# Patient Record
Sex: FEMALE | Race: WHITE | NOT HISPANIC OR LATINO | Employment: FULL TIME | URBAN - METROPOLITAN AREA
[De-identification: names, ages, dates, MRNs, and addresses within clinical notes are randomized per-mention and may not be internally consistent; named-entity substitution may affect disease eponyms.]

---

## 2017-12-17 ENCOUNTER — HOSPITAL ENCOUNTER (EMERGENCY)
Facility: MEDICAL CENTER | Age: 42
End: 2017-12-17
Attending: EMERGENCY MEDICINE
Payer: MEDICAID

## 2017-12-17 VITALS
BODY MASS INDEX: 22.82 KG/M2 | HEIGHT: 69 IN | WEIGHT: 154.1 LBS | DIASTOLIC BLOOD PRESSURE: 57 MMHG | OXYGEN SATURATION: 95 % | HEART RATE: 71 BPM | RESPIRATION RATE: 16 BRPM | SYSTOLIC BLOOD PRESSURE: 112 MMHG | TEMPERATURE: 98.6 F

## 2017-12-17 DIAGNOSIS — N12 PYELONEPHRITIS: ICD-10-CM

## 2017-12-17 DIAGNOSIS — M54.41 ACUTE BILATERAL LOW BACK PAIN WITH RIGHT-SIDED SCIATICA: ICD-10-CM

## 2017-12-17 LAB
APPEARANCE UR: ABNORMAL
BACTERIA #/AREA URNS HPF: ABNORMAL /HPF
BILIRUB UR QL STRIP.AUTO: NEGATIVE
COLOR UR: YELLOW
CULTURE IF INDICATED INDCX: YES UA CULTURE
EPI CELLS #/AREA URNS HPF: ABNORMAL /HPF
GLUCOSE UR STRIP.AUTO-MCNC: NEGATIVE MG/DL
KETONES UR STRIP.AUTO-MCNC: ABNORMAL MG/DL
LEUKOCYTE ESTERASE UR QL STRIP.AUTO: NEGATIVE
MICRO URNS: ABNORMAL
MUCOUS THREADS #/AREA URNS HPF: ABNORMAL /HPF
NITRITE UR QL STRIP.AUTO: POSITIVE
PH UR STRIP.AUTO: 7.5 [PH]
PROT UR QL STRIP: NEGATIVE MG/DL
RBC # URNS HPF: ABNORMAL /HPF
RBC UR QL AUTO: ABNORMAL
SP GR UR STRIP.AUTO: 1.02
WBC #/AREA URNS HPF: ABNORMAL /HPF

## 2017-12-17 PROCEDURE — 81001 URINALYSIS AUTO W/SCOPE: CPT

## 2017-12-17 PROCEDURE — 99284 EMERGENCY DEPT VISIT MOD MDM: CPT

## 2017-12-17 PROCEDURE — 87086 URINE CULTURE/COLONY COUNT: CPT

## 2017-12-17 PROCEDURE — A9270 NON-COVERED ITEM OR SERVICE: HCPCS | Performed by: EMERGENCY MEDICINE

## 2017-12-17 PROCEDURE — 87077 CULTURE AEROBIC IDENTIFY: CPT

## 2017-12-17 PROCEDURE — 96372 THER/PROPH/DIAG INJ SC/IM: CPT

## 2017-12-17 PROCEDURE — 87186 SC STD MICRODIL/AGAR DIL: CPT

## 2017-12-17 PROCEDURE — 700111 HCHG RX REV CODE 636 W/ 250 OVERRIDE (IP): Performed by: EMERGENCY MEDICINE

## 2017-12-17 PROCEDURE — 700102 HCHG RX REV CODE 250 W/ 637 OVERRIDE(OP): Performed by: EMERGENCY MEDICINE

## 2017-12-17 RX ORDER — KETOROLAC TROMETHAMINE 30 MG/ML
30 INJECTION, SOLUTION INTRAMUSCULAR; INTRAVENOUS ONCE
Status: COMPLETED | OUTPATIENT
Start: 2017-12-17 | End: 2017-12-17

## 2017-12-17 RX ORDER — CEPHALEXIN 250 MG/1
500 CAPSULE ORAL ONCE
Status: COMPLETED | OUTPATIENT
Start: 2017-12-17 | End: 2017-12-17

## 2017-12-17 RX ORDER — NAPROXEN 500 MG/1
500 TABLET ORAL 2 TIMES DAILY WITH MEALS
Qty: 20 TAB | Refills: 0 | Status: SHIPPED | OUTPATIENT
Start: 2017-12-17 | End: 2021-08-19

## 2017-12-17 RX ORDER — IBUPROFEN 200 MG
400 TABLET ORAL EVERY 6 HOURS PRN
COMMUNITY

## 2017-12-17 RX ORDER — CEPHALEXIN 500 MG/1
500 CAPSULE ORAL 4 TIMES DAILY
Qty: 40 CAP | Refills: 0 | Status: SHIPPED | OUTPATIENT
Start: 2017-12-17 | End: 2017-12-27

## 2017-12-17 RX ADMIN — CEPHALEXIN 500 MG: 250 CAPSULE ORAL at 17:33

## 2017-12-17 RX ADMIN — KETOROLAC TROMETHAMINE 30 MG: 30 INJECTION, SOLUTION INTRAMUSCULAR at 14:40

## 2017-12-17 ASSESSMENT — PAIN SCALES - GENERAL: PAINLEVEL_OUTOF10: 6

## 2017-12-17 NOTE — ED NOTES
"Chief Complaint   Patient presents with   • Back Pain     Pt c/o R sided back pain and pain into RLE.  Denies urinary symptoms.     /72   Pulse 73   Temp 37 °C (98.6 °F)   Resp 18   Ht 1.753 m (5' 9\")   Wt 69.9 kg (154 lb 1.6 oz)   SpO2 96%   BMI 22.76 kg/m²     "

## 2017-12-17 NOTE — ED PROVIDER NOTES
"ED Provider Note    CHIEF COMPLAINT  Chief Complaint   Patient presents with   • Back Pain     Pt c/o R sided back pain and pain into RLE.  Denies urinary symptoms.       HPI  Harmony Villatoro is a 42 y.o. female who presents For evaluation of right-sided low back pain that radiates some posterior aspect of the right leg without weakness numbness or tingling. No fever. No acute injuries. She's had this on and off for the past 2 months, no known injury to precipitate it. She has never had associated weakness or numbness. She denies any dysuria or hematuria but states that when she has had urine tract infections in past she usually has no symptoms. She otherwise is healthy with no medical problems. She offers no other complaints    REVIEW OF SYSTEMS  Negative for fever, rash, chest pain, dyspnea, abdominal pain.     PAST MEDICAL HISTORY   has a past medical history of Hypohidrotic X-linked ectodermal dysplasia.    SOCIAL HISTORY  Social History     Social History Main Topics   • Smoking status: Current Some Day Smoker   • Smokeless tobacco: Never Used   • Alcohol use Yes   • Drug use: No   • Sexual activity: Not on file       SURGICAL HISTORY   has a past surgical history that includes gyn surgery.    CURRENT MEDICATIONS  I personally reviewed the medication list in the charting documentation.     ALLERGIES  Allergies   Allergen Reactions   • Sulfa Drugs Unspecified     Pt states \"I get bad abdominal pain\".         PHYSICAL EXAM  VITAL SIGNS: /72   Pulse 73   Temp 37 °C (98.6 °F)   Resp 18   Ht 1.753 m (5' 9\")   Wt 69.9 kg (154 lb 1.6 oz)   SpO2 96%   BMI 22.76 kg/m²   Constitutional: Alert in no apparent distress.  HENT: No signs of trauma.   Eyes: Conjunctiva normal, Non-icteric.   Chest: Normal nonlabored respirations  Skin: No erythema, No rash.   Musculoskeletal: Good range of motion in all major joints. Tenderness involving the right low back without midline tenderness or step-offs.  Neurologic: " Alert, No focal deficits noted. Normal symmetric upper and lower show any motor and sensory function bilaterally.  Psychiatric: Affect normal, Judgment normal.    DIAGNOSTIC STUDIES / PROCEDURES    LABS  Results for orders placed or performed during the hospital encounter of 12/17/17   URINALYSIS,CULTURE IF INDICATED   Result Value Ref Range    Color Yellow     Character Cloudy (A)     Specific Gravity 1.020 <1.035    Ph 7.5 5.0 - 8.0    Glucose Negative Negative mg/dL    Ketones Trace (A) Negative mg/dL    Protein Negative Negative mg/dL    Bilirubin Negative Negative    Nitrite Positive (A) Negative    Leukocyte Esterase Negative Negative    Occult Blood Small (A) Negative    Micro Urine Req Microscopic     Culture Indicated Yes UA Culture   URINE MICROSCOPIC (W/UA)   Result Value Ref Range    WBC Rare /hpf    RBC Rare /hpf    Bacteria Many (A) None /hpf    Epithelial Cells Few Few /hpf    Mucous Threads Few /hpf         COURSE & MEDICAL DECISION MAKING  Pertinent Labs & Imaging studies reviewed. (See chart for details)    Encounter Summary: This is a 42 y.o. female with right-sided sciatica that began today, history of the same in the past, no focal neurologic complaints or findings on exam. Will check urinalysis to rule out infection, will administer an IM injection of Toradol. If there is no evidence of infection, she'll be prescribed naproxen and Flexeril and instructed to follow-up with a primary care physician for further evaluation of her sciatica. Will go over strict return instructions. ------ urinalysis is concerning for infection, nitrate positive with many bacteria on the microscopy despite the lack of significant elevation in WBC. Given her back pain I'll treat her with a 10 day course of Keflex although I suspect this urinary finding could be simply incidental and the patient experiencing a musculoskeletal source of pain, she will however follow-up with a primary care physician for further evaluation  and strict return instructions have been discussed.      DISPOSITION: Discharge Home      FINAL IMPRESSION  1. Acute bilateral low back pain with right-sided sciatica    2. Pyelonephritis        This dictation was created using voice recognition software. The accuracy of the dictation is limited to the abilities of the software. I expect there may be some errors of grammar and possibly content. The nursing notes were reviewed and certain aspects of this information were incorporated into this note.    Electronically signed by: Tiago Christie, 12/17/2017 2:33 PM

## 2017-12-18 NOTE — DISCHARGE INSTRUCTIONS
Return immediately to the Emergency Department if you experience continuing or worsening discomfort in your back, any numbness/weakness/tingling, abdominal pain, fever, chest pain, difficulty breathing or any other new or worsening symptoms.        Sciatica  Sciatica is pain, weakness, numbness, or tingling along the path of the sciatic nerve. The nerve starts in the lower back and runs down the back of each leg. The nerve controls the muscles in the lower leg and in the back of the knee, while also providing sensation to the back of the thigh, lower leg, and the sole of your foot. Sciatica is a symptom of another medical condition. For instance, nerve damage or certain conditions, such as a herniated disk or bone spur on the spine, pinch or put pressure on the sciatic nerve. This causes the pain, weakness, or other sensations normally associated with sciatica. Generally, sciatica only affects one side of the body.  CAUSES   · Herniated or slipped disc.  · Degenerative disk disease.  · A pain disorder involving the narrow muscle in the buttocks (piriformis syndrome).  · Pelvic injury or fracture.  · Pregnancy.  · Tumor (rare).  SYMPTOMS   Symptoms can vary from mild to very severe. The symptoms usually travel from the low back to the buttocks and down the back of the leg. Symptoms can include:  · Mild tingling or dull aches in the lower back, leg, or hip.  · Numbness in the back of the calf or sole of the foot.  · Burning sensations in the lower back, leg, or hip.  · Sharp pains in the lower back, leg, or hip.  · Leg weakness.  · Severe back pain inhibiting movement.  These symptoms may get worse with coughing, sneezing, laughing, or prolonged sitting or standing. Also, being overweight may worsen symptoms.  DIAGNOSIS   Your caregiver will perform a physical exam to look for common symptoms of sciatica. He or she may ask you to do certain movements or activities that would trigger sciatic nerve pain. Other tests may  be performed to find the cause of the sciatica. These may include:  · Blood tests.  · X-rays.  · Imaging tests, such as an MRI or CT scan.  TREATMENT   Treatment is directed at the cause of the sciatic pain. Sometimes, treatment is not necessary and the pain and discomfort goes away on its own. If treatment is needed, your caregiver may suggest:  · Over-the-counter medicines to relieve pain.  · Prescription medicines, such as anti-inflammatory medicine, muscle relaxants, or narcotics.  · Applying heat or ice to the painful area.  · Steroid injections to lessen pain, irritation, and inflammation around the nerve.  · Reducing activity during periods of pain.  · Exercising and stretching to strengthen your abdomen and improve flexibility of your spine. Your caregiver may suggest losing weight if the extra weight makes the back pain worse.  · Physical therapy.  · Surgery to eliminate what is pressing or pinching the nerve, such as a bone spur or part of a herniated disk.  HOME CARE INSTRUCTIONS   · Only take over-the-counter or prescription medicines for pain or discomfort as directed by your caregiver.  · Apply ice to the affected area for 20 minutes, 3-4 times a day for the first 48-72 hours. Then try heat in the same way.  · Exercise, stretch, or perform your usual activities if these do not aggravate your pain.  · Attend physical therapy sessions as directed by your caregiver.  · Keep all follow-up appointments as directed by your caregiver.  · Do not wear high heels or shoes that do not provide proper support.  · Check your mattress to see if it is too soft. A firm mattress may lessen your pain and discomfort.  SEEK IMMEDIATE MEDICAL CARE IF:   · You lose control of your bowel or bladder (incontinence).  · You have increasing weakness in the lower back, pelvis, buttocks, or legs.  · You have redness or swelling of your back.  · You have a burning sensation when you urinate.  · You have pain that gets worse when you  lie down or awakens you at night.  · Your pain is worse than you have experienced in the past.  · Your pain is lasting longer than 4 weeks.  · You are suddenly losing weight without reason.  MAKE SURE YOU:  · Understand these instructions.  · Will watch your condition.  · Will get help right away if you are not doing well or get worse.     This information is not intended to replace advice given to you by your health care provider. Make sure you discuss any questions you have with your health care provider.     Document Released: 12/12/2002 Document Revised: 06/18/2013 Document Reviewed: 04/28/2013  [employer] Interactive Patient Education ©2016 [employer]          Pyelonephritis, Adult  Pyelonephritis is a kidney infection. In general, there are 2 main types of pyelonephritis:  · Infections that come on quickly without any warning (acute pyelonephritis).  · Infections that persist for a long period of time (chronic pyelonephritis).  CAUSES   Two main causes of pyelonephritis are:  · Bacteria traveling from the bladder to the kidney. This is a problem especially in pregnant women. The urine in the bladder can become filled with bacteria from multiple causes, including:  ¨ Inflammation of the prostate gland (prostatitis).  ¨ Sexual intercourse in females.  ¨ Bladder infection (cystitis).  · Bacteria traveling from the bloodstream to the tissue part of the kidney.  Problems that may increase your risk of getting a kidney infection include:  · Diabetes.  · Kidney stones or bladder stones.  · Cancer.  · Catheters placed in the bladder.  · Other abnormalities of the kidney or ureter.  SYMPTOMS   · Abdominal pain.  · Pain in the side or flank area.  · Fever.  · Chills.  · Upset stomach.  · Blood in the urine (dark urine).  · Frequent urination.  · Strong or persistent urge to urinate.  · Burning or stinging when urinating.  DIAGNOSIS   Your caregiver may diagnose your kidney infection based on your symptoms. A urine  sample may also be taken.  TREATMENT   In general, treatment depends on how severe the infection is.   · If the infection is mild and caught early, your caregiver may treat you with oral antibiotics and send you home.  · If the infection is more severe, the bacteria may have gotten into the bloodstream. This will require intravenous (IV) antibiotics and a hospital stay. Symptoms may include:  ¨ High fever.  ¨ Severe flank pain.  ¨ Shaking chills.  · Even after a hospital stay, your caregiver may require you to be on oral antibiotics for a period of time.  · Other treatments may be required depending upon the cause of the infection.  HOME CARE INSTRUCTIONS   · Take your antibiotics as directed. Finish them even if you start to feel better.  · Make an appointment to have your urine checked to make sure the infection is gone.  · Drink enough fluids to keep your urine clear or pale yellow.  · Take medicines for the bladder if you have urgency and frequency of urination as directed by your caregiver.  SEEK IMMEDIATE MEDICAL CARE IF:   · You have a fever or persistent symptoms for more than 2-3 days.  · You have a fever and your symptoms suddenly get worse.  · You are unable to take your antibiotics or fluids.  · You develop shaking chills.  · You experience extreme weakness or fainting.  · There is no improvement after 2 days of treatment.  MAKE SURE YOU:  · Understand these instructions.  · Will watch your condition.  · Will get help right away if you are not doing well or get worse.     This information is not intended to replace advice given to you by your health care provider. Make sure you discuss any questions you have with your health care provider.     Document Released: 12/18/2006 Document Revised: 06/18/2013 Document Reviewed: 05/23/2012  [employer] Interactive Patient Education ©2016 [employer]

## 2017-12-19 LAB
BACTERIA UR CULT: ABNORMAL
BACTERIA UR CULT: ABNORMAL
SIGNIFICANT IND 70042: ABNORMAL
SITE SITE: ABNORMAL
SOURCE SOURCE: ABNORMAL

## 2017-12-19 NOTE — ED NOTES
ED Positive Culture Follow-up/Notification Note:    Date: 12/19/17     Patient seen in the ED on 12/17/2017 for right sided back pain with radiation to the right leg without tingling or numbness. States her UTIs are usually asymptomatic.  1. Acute bilateral low back pain with right-sided sciatica    2. Pyelonephritis       Discharge Medication List as of 12/17/2017  5:17 PM      START taking these medications    Details   cephALEXin (KEFLEX) 500 MG Cap Take 1 Cap by mouth 4 times a day for 10 days., Disp-40 Cap, R-0, Normal      naproxen (NAPROSYN) 500 MG Tab Take 1 Tab by mouth 2 times a day, with meals., Disp-20 Tab, R-0, Normal             Allergies: Sulfa drugs     Final cultures:   Results     Procedure Component Value Units Date/Time    URINE CULTURE(NEW) [[id number]]  (Abnormal)  (Susceptibility) Collected:  12/17/17 1625    Order Status:  Completed Specimen:  Urine Updated:  12/19/17 0732     Significant Indicator POS (POS)     Source UR     Site --     Urine Culture -- (A)     Urine Culture -- (A)     Escherichia coli  >100,000 cfu/mL      Culture & Susceptibility     ESCHERICHIA COLI     Antibiotic Sensitivity Microscan Unit Status    Ampicillin Sensitive <=8 mcg/mL Final    Cefepime Sensitive <=8 mcg/mL Final    Cefotaxime Sensitive <=2 mcg/mL Final    Cefotetan Sensitive <=16 mcg/mL Final    Ceftazidime Sensitive <=1 mcg/mL Final    Ceftriaxone Sensitive <=8 mcg/mL Final    Cefuroxime Sensitive <=4 mcg/mL Final    Cephalothin Intermediate 16 mcg/mL Final    Ciprofloxacin Sensitive <=1 mcg/mL Final    Gentamicin Sensitive <=4 mcg/mL Final    Levofloxacin Sensitive <=2 mcg/mL Final    Nitrofurantoin Sensitive <=32 mcg/mL Final    Pip/Tazobactam Sensitive <=16 mcg/mL Final    Piperacillin Sensitive <=16 mcg/mL Final    Tigecycline Sensitive <=2 mcg/mL Final    Tobramycin Sensitive <=4 mcg/mL Final    Trimeth/Sulfa Sensitive <=2/38 mcg/mL Final                       URINALYSIS,CULTURE IF INDICATED [[id number]]  " (Abnormal) Collected:  12/17/17 1625    Order Status:  Completed Specimen:  Urine Updated:  12/17/17 1656     Color Yellow     Character Cloudy (A)     Specific Gravity 1.020     Comment: Corrected result; previously reported as 1.010 on 12/17/17 at 16:37        Ph 7.5     Comment: Corrected result; previously reported as 6.5 on 12/17/17 at 16:37        Glucose Negative mg/dL      Ketones Trace (A) mg/dL      Protein Negative mg/dL      Bilirubin Negative     Nitrite Positive (A)     Comment: Corrected result; previously reported as Negative on 12/17/17 at 16:37        Leukocyte Esterase Negative     Occult Blood Small (A)     Micro Urine Req Microscopic     Culture Indicated Yes UA Culture           Plan:   Discussed result with the patient. She states her sciatica is a little better. Remains asymptomatic except \"kidney pain.\"  She believes she may be responding to the cephalexin.   Cephalexin does obtain better activity than cephalothin and thus may overcome the reported intermediate resistance.   She does not believe she needs to change antibiotic therapy and will get a follow up PCP appt at the end of the course of antibiotics to assess need at that time.   She did express gratitude for the great care she received in the ER and to Dr. Fam for his excellence.     Yamilka Veliz"

## 2021-06-15 ENCOUNTER — HOSPITAL ENCOUNTER (EMERGENCY)
Dept: HOSPITAL 8 - ED | Age: 46
Discharge: HOME | End: 2021-06-15
Payer: SELF-PAY

## 2021-06-15 VITALS — WEIGHT: 165.35 LBS | HEIGHT: 69 IN | BODY MASS INDEX: 24.49 KG/M2

## 2021-06-15 VITALS — SYSTOLIC BLOOD PRESSURE: 122 MMHG | DIASTOLIC BLOOD PRESSURE: 65 MMHG

## 2021-06-15 DIAGNOSIS — R45.851: Primary | ICD-10-CM

## 2021-06-15 DIAGNOSIS — F32.9: ICD-10-CM

## 2021-06-15 DIAGNOSIS — F17.210: ICD-10-CM

## 2021-06-15 DIAGNOSIS — F10.129: ICD-10-CM
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
## 2021-06-15 LAB
ALBUMIN SERPL-MCNC: 4.1 G/DL (ref 3.4–5)
ALP SERPL-CCNC: 72 U/L (ref 45–117)
ALT SERPL-CCNC: 28 U/L (ref 12–78)
ANION GAP SERPL CALC-SCNC: 4 MMOL/L (ref 5–15)
BASOPHILS # BLD AUTO: 0.1 X10^3/UL (ref 0–0.1)
BASOPHILS NFR BLD AUTO: 1 % (ref 0–1)
BILIRUB SERPL-MCNC: 0.2 MG/DL (ref 0.2–1)
CALCIUM SERPL-MCNC: 8.9 MG/DL (ref 8.5–10.1)
CHLORIDE SERPL-SCNC: 110 MMOL/L (ref 98–107)
CREAT SERPL-MCNC: 0.83 MG/DL (ref 0.55–1.02)
EOSINOPHIL # BLD AUTO: 0.5 X10^3/UL (ref 0–0.4)
EOSINOPHIL NFR BLD AUTO: 5 % (ref 1–7)
ERYTHROCYTE [DISTWIDTH] IN BLOOD BY AUTOMATED COUNT: 12.5 % (ref 9.6–15.2)
LYMPHOCYTES # BLD AUTO: 3.6 X10^3/UL (ref 1–3.4)
LYMPHOCYTES NFR BLD AUTO: 37 % (ref 22–44)
MCH RBC QN AUTO: 34.1 PG (ref 27–34.8)
MCHC RBC AUTO-ENTMCNC: 35 G/DL (ref 32.4–35.8)
MONOCYTES # BLD AUTO: 0.6 X10^3/UL (ref 0.2–0.8)
MONOCYTES NFR BLD AUTO: 6 % (ref 2–9)
NEUTROPHILS # BLD AUTO: 5.1 X10^3/UL (ref 1.8–6.8)
NEUTROPHILS NFR BLD AUTO: 51 % (ref 42–75)
PLATELET # BLD AUTO: 333 X10^3/UL (ref 130–400)
PMV BLD AUTO: 7.7 FL (ref 7.4–10.4)
PROT SERPL-MCNC: 8.3 G/DL (ref 6.4–8.2)
RBC # BLD AUTO: 4.68 X10^6/UL (ref 3.82–5.3)
VANCOMYCIN TROUGH SERPL-MCNC: 5.6 MG/DL (ref 2.8–20)

## 2021-06-15 PROCEDURE — 80053 COMPREHEN METABOLIC PANEL: CPT

## 2021-06-15 PROCEDURE — 80329 ANALGESICS NON-OPIOID 1 OR 2: CPT

## 2021-06-15 PROCEDURE — 80299 QUANTITATIVE ASSAY DRUG: CPT

## 2021-06-15 PROCEDURE — 99284 EMERGENCY DEPT VISIT MOD MDM: CPT

## 2021-06-15 PROCEDURE — 80320 DRUG SCREEN QUANTALCOHOLS: CPT

## 2021-06-15 PROCEDURE — 80307 DRUG TEST PRSMV CHEM ANLYZR: CPT

## 2021-06-15 PROCEDURE — 84443 ASSAY THYROID STIM HORMONE: CPT

## 2021-06-15 PROCEDURE — 84703 CHORIONIC GONADOTROPIN ASSAY: CPT

## 2021-06-15 PROCEDURE — G0480 DRUG TEST DEF 1-7 CLASSES: HCPCS

## 2021-06-15 PROCEDURE — 85025 COMPLETE CBC W/AUTO DIFF WBC: CPT

## 2021-06-15 PROCEDURE — 36415 COLL VENOUS BLD VENIPUNCTURE: CPT

## 2021-06-15 NOTE — NUR
PT HAS BEEN RESTING ALL MORNING, AWAKENS EASILY. BREATHALYZER DONE, WAS UNDER 
0.08. PSYCH APRN IN TO SPEAK WITH PT NOW.

## 2021-06-15 NOTE — NUR
Patient is resting comfortably in bed. Bed in lowest, rails engaged, call light 
on lap.

Vital Signs within normal limits. WCTM.
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
## 2021-06-15 NOTE — NUR
REPORT RC'VD FROM CRESCENCIO COY. PT RESTING QUIETLY IN [facility], AWAKENS EASILY. 
SITTER OUTSIDE ROOM AT ALL TIMES.

## 2021-06-15 NOTE — NUR
Patient/Caregiver given discharge instructions and they have confirmed that 
they understand the instructions.  Patient ambulatory with steady gait. NAD, 
all questions answered appropriately, denies additional needs at this time. No 
personal belongings left in room after discharge. ETOH REFERRALS GIVEN TO PT.

## 2021-06-15 NOTE — NUR
Patient is sleeping comfortably in bed. Eyes closed. Bed in lowest, rails 
engaged, call light on lap.

Vital Signs within normal limits. WCTM. sitter outside room for safety 
monitoring. Garage doors secured. nadn. pt condition unchanged. SI food tray 
ordered.
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
## 2021-07-29 ENCOUNTER — HOSPITAL ENCOUNTER (EMERGENCY)
Facility: MEDICAL CENTER | Age: 46
End: 2021-07-29
Attending: EMERGENCY MEDICINE
Payer: COMMERCIAL

## 2021-07-29 ENCOUNTER — NON-PROVIDER VISIT (OUTPATIENT)
Dept: OCCUPATIONAL MEDICINE | Facility: CLINIC | Age: 46
End: 2021-07-29
Payer: COMMERCIAL

## 2021-07-29 VITALS
WEIGHT: 160.72 LBS | BODY MASS INDEX: 23.8 KG/M2 | RESPIRATION RATE: 17 BRPM | HEIGHT: 69 IN | SYSTOLIC BLOOD PRESSURE: 146 MMHG | DIASTOLIC BLOOD PRESSURE: 86 MMHG | OXYGEN SATURATION: 97 % | HEART RATE: 62 BPM | TEMPERATURE: 97.8 F

## 2021-07-29 DIAGNOSIS — S61.011A THUMB LACERATION, RIGHT, INITIAL ENCOUNTER: ICD-10-CM

## 2021-07-29 DIAGNOSIS — Z02.89 ENCOUNTER FOR OCCUPATIONAL HEALTH ASSESSMENT: Primary | ICD-10-CM

## 2021-07-29 LAB
AMP AMPHETAMINE: NORMAL
BAR BARBITURATES: NORMAL
BZO BENZODIAZEPINES: NORMAL
COC COCAINE: NORMAL
INT CON NEG: NORMAL
INT CON POS: NORMAL
MDMA ECSTASY: NORMAL
MET METHAMPHETAMINES: NORMAL
MTD METHADONE: NORMAL
OPI OPIATES: NORMAL
OXY OXYCODONE: NORMAL
PCP PHENCYCLIDINE: NORMAL
POC URINE DRUG SCREEN OCDRS: NORMAL
THC: NORMAL

## 2021-07-29 PROCEDURE — 302874 HCHG BANDAGE ACE 2 OR 3"": Mod: EDC

## 2021-07-29 PROCEDURE — 303747 HCHG EXTRA SUTURE: Mod: EDC

## 2021-07-29 PROCEDURE — 304217 HCHG IRRIGATION SYSTEM: Mod: EDC

## 2021-07-29 PROCEDURE — 304999 HCHG REPAIR-SIMPLE/INTERMED LEVEL 1: Mod: EDC

## 2021-07-29 PROCEDURE — 80305 DRUG TEST PRSMV DIR OPT OBS: CPT | Performed by: PREVENTIVE MEDICINE

## 2021-07-29 PROCEDURE — 99283 EMERGENCY DEPT VISIT LOW MDM: CPT | Mod: EDC

## 2021-07-29 PROCEDURE — 700101 HCHG RX REV CODE 250: Performed by: EMERGENCY MEDICINE

## 2021-07-29 RX ORDER — LIDOCAINE HYDROCHLORIDE 10 MG/ML
20 INJECTION, SOLUTION INFILTRATION; PERINEURAL ONCE
Status: COMPLETED | OUTPATIENT
Start: 2021-07-29 | End: 2021-07-29

## 2021-07-29 RX ADMIN — LIDOCAINE HYDROCHLORIDE 20 ML: 10 INJECTION, SOLUTION INFILTRATION; PERINEURAL at 21:00

## 2021-07-29 NOTE — LETTER
"  FORM C-4:  EMPLOYEE’S CLAIM FOR COMPENSATION/ REPORT OF INITIAL TREATMENT  EMPLOYEE’S CLAIM - PROVIDE ALL INFORMATION REQUESTED   First Name Harmony Last Name Shauna Birthdate 1975  Sex female Claim Number   Home Address [address]   [facility]             Zip [address]                                   Age  45 y.o. Height  1.753 m (5' 9\") Weight  72.9 kg (160 lb 11.5 oz) [facility]  [id number]  xxx-xx-2019   Mailing Address [address]  [facility]              Zip [address] Telephone  [phone] (home)  Primary Language Spoken   Insurer   Third Party   [employer] Employee's Occupation (Job Title) When Injury or Occupational Disease Occurred     Employer's Name [employer] Telephone  [phone]    Employer Address [address] [place] [address] [facility] [5] Zip [address]   Date of Injury  7/29/2021       Hour of Injury  4:45 PM Date Employer Notified  7/29/2021 Last Day of Work after Injury or Occupational Disease  7/29/2021 Supervisor to Whom Injury Reported  Regino Parnell   Address or Location of Accident (if applicable) Work [1]   What were you doing at the time of accident? (if applicable) Taping a box closed    How did this injury or occupational disease occur? Be specific and answer in detail. Use additional sheet if necessary)  Taping a box close with a tape gun. Tape snapped and Blade Bit into thumb Through my sleeve.   If you believe that you have an occupational disease, when did you first have knowledge of the disability and it relationship to your employment?  Witnesses to the Accident  N/A   Nature of Injury or Occupational Disease  Workers' Compensation Part(s) of Body Injured or Affected  Thumb (R), N/A, N/A    I CERTIFY THAT THE ABOVE IS TRUE AND CORRECT TO THE BEST OF MY KNOWLEDGE AND THAT I HAVE PROVIDED THIS INFORMATION IN ORDER TO OBTAIN THE BENEFITS OF [place] AND " [place]).  I HEREBY AUTHORIZE ANY PHYSICIAN, CHIROPRACTOR, SURGEON, PRACTITIONER, OR OTHER PERSON, ANY HOSPITAL, INCLUDING [place] OR [facility], ANY MEDICAL SERVICE ORGANIZATION, ANY INSURANCE COMPANY, OR OTHER INSTITUTION OR ORGANIZATION TO RELEASE TO EACH OTHER, ANY MEDICAL OR OTHER INFORMATION, INCLUDING BENEFITS PAID OR PAYABLE, PERTINENT TO THIS INJURY OR DISEASE, EXCEPT INFORMATION RELATIVE TO DIAGNOSIS, TREATMENT AND/OR COUNSELING FOR AIDS, PSYCHOLOGICAL CONDITIONS, ALCOHOL OR CONTROLLED SUBSTANCES, FOR WHICH I MUST GIVE SPECIFIC AUTHORIZATION.  A PHOTOSTAT OF THIS AUTHORIZATION SHALL BE AS VALID AS THE ORIGINAL.  Date 07/29/2021      [facility]                      Employee’s Signature   THIS REPORT MUST BE COMPLETED AND MAILED WITHIN 3 WORKING DAYS OF TREATMENT   Place [facility], EMERGENCY DEPT                       Name of Facility [facility]   Date  7/29/2021 Diagnosis  (S61.011A) Thumb laceration, right, initial encounter Is there evidence the injured employee was under the influence of alcohol and/or another controlled substance at the time of accident?   Hour  9:10 PM Description of Injury or Disease  Thumb laceration, right, initial encounter No   Treatment  sutures  Have you advised the patient to remain off work five days or more?         No   X-Ray Findings    If Yes   From Date    To Date      From information given by the employee, together with medical evidence, can you directly connect this injury or occupational disease as job incurred? No If No, is employee capable of: Full Duty  No Modified Duty  Yes   Is additional medical care by a physician indicated? Yes If Modified Duty, Specify any Limitations / Restrictions   Limited use of right thumb until laceration has healed.   Do you know of any previous injury or disease contributing to  "this condition or occupational disease? No    Date 7/29/2021 Print Doctor’s Name Candice Stinson I certify the employer’s copy of this form was mailed on:   Address [address] [place] [address]  [place] [address] [phone] INSURER’S USE ONLY   Provider’s Tax ID Number [id number] Telephone Dept: [phone]    Doctor’s Signature CANDICE Fowler M.D. Degree  M.D.      Form C-4 (rev.10/07)                                                                         BRIEF DESCRIPTION OF RIGHTS AND BENEFITS  (Pursuant to [place] 616C.050)    Notice of Injury or Occupational Disease (Incident Report Form C-1): If an injury or occupational disease (OD) arises out of and in the course of employment, you must provide written notice to your employer as soon as practicable, but no later than 7 days after the accident or OD. Your employer shall maintain a sufficient supply of the required forms.    Claim for Compensation (Form C-4): If medical treatment is sought, the form C-4 is available at the place of initial treatment. A completed \"Claim for Compensation\" (Form C-4) must be filed within 90 days after an accident or OD. The treating physician or chiropractor must, within 3 working days after treatment, complete and mail to the employer, the employer's insurer and third-party , the Claim for Compensation.    Medical Treatment: If you require medical treatment for your on-the-job injury or OD, you may be required to select a physician or chiropractor from a list provided by your workers’ compensation insurer, if it has contracted with an Organization for Managed Care (MCO) or Preferred Provider Organization (PPO) or providers of health care. If your employer has not entered into a contract with an MCO or PPO, you may select a physician or chiropractor from the Panel of Physicians and Chiropractors. Any medical costs related to your industrial injury or OD will be paid by your insurer.    Temporary Total Disability (TTD): If your " doctor has certified that you are unable to work for a period of at least 5 consecutive days, or 5 cumulative days in a 20-day period, or places restrictions on you that your employer does not accommodate, you may be entitled to TTD compensation.    Temporary Partial Disability (TPD): If the wage you receive upon reemployment is less than the compensation for TTD to which you are entitled, the insurer may be required to pay you TPD compensation to make up the difference. TPD can only be paid for a maximum of 24 months.    Permanent Partial Disability (PPD): When your medical condition is stable and there is an indication of a PPD as a result of your injury or OD, within 30 days, your insurer must arrange for an evaluation by a rating physician or chiropractor to determine the degree of your PPD. The amount of your PPD award depends on the date of injury, the results of the PPD evaluation, your age and wage.    Permanent Total Disability (PTD): If you are medically certified by a treating physician or chiropractor as permanently and totally disabled and have been granted a PTD status by your insurer, you are entitled to receive monthly benefits not to exceed 66 2/3% of your average monthly wage. The amount of your PTD payments is subject to reduction if you previously received a lump-sum PPD award.    Vocational Rehabilitation Services: You may be eligible for vocational rehabilitation services if you are unable to return to the job due to a permanent physical impairment or permanent restrictions as a result of your injury or occupational disease.    Transportation and Per Yadira Reimbursement: You may be eligible for travel expenses and per yadira associated with medical treatment.    Reopening: You may be able to reopen your claim if your condition worsens after claim closure.     Appeal Process: If you disagree with a written determination issued by the insurer or the insurer does not respond to your request, you may  appeal to the Department of Administration, , by following the instructions contained in your determination letter. You must appeal the determination within 70 days from the date of the determination letter at [address], [address], [place], [place] [address], or [address], [place], [place] [address]. If you disagree with the  decision, you may appeal to the Department of Administration, . You must file your appeal within 30 days from the date of the  decision letter at [address], [address], [place], [place] [address], or [address], [address], [place], [place] [address]. If you disagree with a decision of an , you may file a petition for judicial review with the District Court. You must do so within 30 days of the Appeal Officer’s decision. You may be represented by an  at your own expense or you may contact the [facility] for possible representation.    [place]  for Injured Workers (NAIW): If you disagree with a  decision, you may request that NAIW represent you without charge at an  Hearing. For information regarding denial of benefits, you may contact the [facility] at: [address], [address], [place] [address], [phone], or [address], [address], [place] [address], [phone]    To File a Complaint with the Division: If you wish to file a complaint with the  of the Division of Industrial Relations (DIR),  please contact the Workers’ Compensation Section, [address], [address], [place], [place] [address], telephone ([phone]) [phone], or [address], [address], [place], [place] [address], telephone [phone].    For assistance with Workers’ Compensation Issues: You may contact the [facility] Office for Consumer Health Assistance, [address], [address], [place], [place]  [address], Toll Free 1-622.538.3370, Web site: http://[facility].[place].gov/Programs/DOROTHY E-mail: dorothy@[email or web address].[place].gov  D-2 (rev. 10/20)              __________________________________________________________________                                   07/29/2021            Employee Name / Signature                                                                                                                            Date

## 2021-07-30 NOTE — ED PROVIDER NOTES
"ED Provider Note    Scribed for Candice Stinson M.D. by Modesto Monge. 7/29/2021, 8:31 PM.    Primary care provider: No primary care provider noted  Means of arrival: Private Vehicle  History obtained from: Patient  History limited by: None    CHIEF COMPLAINT  Chief Complaint   Patient presents with    Hand Laceration     right thumb,cms+     HPI  Harmony Villatoro is a 45 y.o. female who presents to the Emergency Department for evaluation of a laceration to the right thumb onset prior to arrival. She reports she was at work, when she accidentally cut her right thumb with a tape dispenser. She denies numbness/tingling in the right thumb or decreased range of motion of the right thumb. No alleviating factors were reported. Her tetanus is up to date.     REVIEW OF SYSTEMS  Pertinent positives include  laceration to the right thumb.   Pertinent negatives include no numbness/tingling in the right thumb or decreased range of motion of the right thumb.      PAST MEDICAL HISTORY   has a past medical history of Hypohidrotic X-linked ectodermal dysplasia.    SURGICAL HISTORY   has a past surgical history that includes gyn surgery.    SOCIAL HISTORY  Social History     Tobacco Use    Smoking status: Current Some Day Smoker    Smokeless tobacco: Never Used   Substance Use Topics    Alcohol use: Yes    Drug use: No      Social History     Substance and Sexual Activity   Drug Use No     FAMILY HISTORY  History reviewed. No pertinent family history.    CURRENT MEDICATIONS  Home Medications       Reviewed by Janet Rojas R.N. (Registered Nurse) on 07/29/21 at 6052  Med List Status: Partial     Medication Last Dose Status   ibuprofen (MOTRIN) 200 MG Tab  Active   naproxen (NAPROSYN) 500 MG Tab  Active                  ALLERGIES  Allergies   Allergen Reactions    Sulfa Drugs Unspecified     Pt states \"I get bad abdominal pain\".         PHYSICAL EXAM  VITAL SIGNS: /87   Pulse 79   Temp 36.3 °C (97.3 °F) (Temporal)   " "Resp 16   Ht 1.753 m (5' 9\")   Wt 72.9 kg (160 lb 11.5 oz)   SpO2 97%   BMI 23.73 kg/m²     Constitutional: Well developed, Well nourished, No acute distress, Non-toxic appearance.   Cardiovascular: Good pulses on the affected extremity. Good capillary refill.  Thorax & Lungs: No respiratory distress  Skin: 3 cm laceration to the dorsal aspect of the right thumb, minimal bleeding. No foreign body. No joint involvement. Able to visualize extensor tendon, but no laceration visualized through full range of motion.   Musculoskeletal: Normal range of motion of thumb.   Neurologic: Good sensation to light touch on the distal affected extremity.      PROCEDURES:    Laceration Repair Procedure Note    Indication: Laceration    Procedure: The patient was placed in the appropriate position and anesthesia around the laceration was obtained by infiltration using 1% Lidocaine without epinephrine. The area was then irrigated with normal saline. The laceration was closed with 5-0 Ethilon using interrupted sutures. There were no additional lacerations requiring repair. The wound area was then dressed with a sterile dressing.      Total repaired wound length: 3 cm.     Other Items: Suture count: 4    The patient tolerated the procedure well.    Complications: None    COURSE & MEDICAL DECISION MAKING  Nursing notes, VS, PMSFHx reviewed in chart.    8:31 PM - Patient seen and examined at bedside. The patient presents for evaluation of a laceration to the right thumb.  There is no evidence of foreign body, tendon laceration or infection.  Normal extension of the thumb.  Tetanus up-to-date.  Discussed plan for Laceration Repair Procedure. Patient verbalizes understanding and agreement to this plan of care.      8:54 PM - Laceration Repair Procedure by me. I discussed plan for discharge and follow up as outlined below. The patient verbalizes they feel comfortable going home. The patient is stable for discharge at this time and will " return for any new or worsening symptoms. Patient verbalizes understanding and support with my plan for discharge.      The patient will return for new or worsening symptoms and is stable at the time of discharge.    Patient has had high blood pressure while in the emergency department, felt likely secondary to medical condition. Counseled patient to monitor blood pressure at home and follow up with primary care physician.      DISPOSITION:  Patient will be discharged home in stable condition.    FOLLOW UP:  Shira Ring  [address]  [place] [address]  [phone]    Schedule an appointment as soon as possible for a visit in 1 week  If symptoms worsen, return to the er.    FINAL IMPRESSION  1. Thumb laceration, right, initial encounter        Modesto POLK (Scribe), am scribing for, and in the presence of, Candice Stinson M.D..    Electronically signed by: Modesto Monge (Scribe), 7/29/2021    ICandice M.D. personally performed the services described in this documentation, as scribed by Modesto Monge in my presence, and it is both accurate and complete.    The note accurately reflects work and decisions made by me.  Candice Stinson M.D.  7/29/2021  10:00 PM

## 2021-07-30 NOTE — DISCHARGE INSTRUCTIONS
Your sutures need to be removed in 7 days.  Avoid soaking your hand I do not use your thumb a lot until the sutures are removed.  You can do some gentle activity if you can do it with the splint on at work.  Any redness drainage pain return.  Hope you feel better soon.

## 2021-07-30 NOTE — ED NOTES
Dressing and aluminum splint applied by ED tech. Pt given crackers and water upon request, updated on plan of care. Awaiting registration to complete workers comp paperwork.

## 2021-07-30 NOTE — ED TRIAGE NOTES
Chief Complaint   Patient presents with   • Hand Laceration     right thumb,cms+     Pt ambulated to triage , pt was using tape runner/dispenser and accidentally cut her right thumb . Bleeding controlled, up to date with tetanus vaccination.

## 2021-07-30 NOTE — ED NOTES
Discharge teaching done with pt, verbalized understanding. No prescriptions given. Pt educated on laceration care, instructed to follow up with primary doctor in

## 2021-08-05 ENCOUNTER — OCCUPATIONAL MEDICINE (OUTPATIENT)
Dept: URGENT CARE | Facility: CLINIC | Age: 46
End: 2021-08-05
Payer: COMMERCIAL

## 2021-08-05 VITALS
HEIGHT: 69 IN | OXYGEN SATURATION: 96 % | RESPIRATION RATE: 16 BRPM | HEART RATE: 100 BPM | TEMPERATURE: 96.9 F | BODY MASS INDEX: 23.61 KG/M2 | DIASTOLIC BLOOD PRESSURE: 80 MMHG | SYSTOLIC BLOOD PRESSURE: 132 MMHG | WEIGHT: 159.4 LBS

## 2021-08-05 DIAGNOSIS — Z48.02 VISIT FOR SUTURE REMOVAL: ICD-10-CM

## 2021-08-05 DIAGNOSIS — S61.011D LACERATION OF RIGHT THUMB WITHOUT FOREIGN BODY WITHOUT DAMAGE TO NAIL, SUBSEQUENT ENCOUNTER: ICD-10-CM

## 2021-08-05 PROCEDURE — 99212 OFFICE O/P EST SF 10 MIN: CPT | Performed by: PHYSICIAN ASSISTANT

## 2021-08-05 RX ORDER — DULOXETIN HYDROCHLORIDE 30 MG/1
30 CAPSULE, DELAYED RELEASE ORAL
COMMUNITY
Start: 2021-05-12

## 2021-08-05 NOTE — LETTER
"   [facility]  [address] [facility] [place] [address]  Phone:  [phone] - Fax:  [phone]   [facility] Progress Report and Disability Certification  Date of Service: 8/5/2021   No Show:  No  Date / Time of Next Visit:     Claim Information   Patient Name: Harmony Villatoro  Claim Number:     Employer: [employer]  Date of Injury: 7/29/2021     Insurer / TPA: Nnamdi Tray  ID / SSN:     Occupation:   Diagnosis: Diagnoses of Visit for suture removal and Laceration of right thumb without foreign body without damage to nail, subsequent encounter were pertinent to this visit.    Medical Information   Related to Industrial Injury? Yes    Subjective Complaints:  DOI: 7/29/21  Patient presents for first follow-up visit secondary to work-related injury that occurred 7 days ago.  Patient had right thumb laceration repair done in emergency department.  \"She reports she was at work, when she accidentally cut her right thumb with a tape dispenser. She denies numbness/tingling in the right thumb or decreased range of motion of the right thumb. No alleviating factors were reported. Her tetanus is up to date.\"  Patient reports lacerations healing well.  No surrounding redness, pain, or discharge.  Patient was not put on prophylactic antibiotic treatment.  She is ready to return back to work full duty.      Objective Findings: Patient is generally well-appearing and in no acute distress.  A&O by 4.  Skin: Right thumb-4 interrupted sutures in place over dorsal aspect of left thumb.  No wound dehiscence or discharge.  No surrounding erythema or warmth.  Good wound approximation.   Pre-Existing Condition(s):     Assessment:   Condition Improved    Status: Discharged /  MMI  Permanent Disability:No    Plan:      Diagnostics:      Comments:  Procedure: Suture removal nine 4 interrupted sutures removed without difficulty.  No wound dehiscence. "  Patient tolerated well.    Disability Information   Status: Released to Full Duty    From:  8/5/2021  Through:   Restrictions are:     Physical Restrictions   Sitting:    Standing:    Stooping:    Bending:      Squatting:    Walking:    Climbing:    Pushing:      Pulling:    Other:    Reaching Above Shoulder (L):   Reaching Above Shoulder (R):       Reaching Below Shoulder (L):    Reaching Below Shoulder (R):      Not to exceed Weight Limits   Carrying(hrs):   Weight Limit(lb):   Lifting(hrs):   Weight  Limit(lb):     Comments: -Patient discharged MMI and may return to work full duty.  -Recommend she keep healing wound clean, dry, and covered while at work.  -Continue to monitor for signs of developing infection including redness, streaking, fevers, or purulent discharge.  -Patient verbalized understanding of treatment plan and has no further questions regarding care.      Repetitive Actions   Hands: i.e. Fine Manipulations from Grasping:     Feet: i.e. Operating Foot Controls:     Driving / Operate Machinery:     Provider Name:   Jessica Davison P.A.-C. Physician Signature:  Physician Name:     Clinic Name / Location: [address] [address] Clinic Phone Number: Dept: [phone]   Appointment Time: 9:00 Pm Visit Start Time: 9:09 PM   Check-In Time:  8:51 Pm Visit Discharge Time:  9:27 PM   Original-Treating Physician or Chiropractor    Page 2-Insurer/TPA    Page 3-Employer    Page 4-Employee

## 2021-08-06 NOTE — PROGRESS NOTES
"Subjective:      Harmony Villatoro is a 45 y.o. female who presents with Follow-Up (Thumb ( needs to take sutures out, got them 1 week ago ))      DOI: 7/29/21  Patient presents for first follow-up visit secondary to work-related injury that occurred 7 days ago.  Patient had right thumb laceration repair done in emergency department.  \"She reports she was at work, when she accidentally cut her right thumb with a tape dispenser. She denies numbness/tingling in the right thumb or decreased range of motion of the right thumb. No alleviating factors were reported. Her tetanus is up to date.\"  Patient reports lacerations healing well.  No surrounding redness, pain, or discharge.  Patient was not put on prophylactic antibiotic treatment.  She is ready to return back to work full duty.        HPI    ROS       Objective:     /80   Pulse 100   Temp 36.1 °C (96.9 °F) (Temporal)   Resp 16   Ht 1.753 m (5' 9\")   Wt 72.3 kg (159 lb 6.4 oz)   SpO2 96%   BMI 23.54 kg/m²      Physical Exam    Patient is generally well-appearing and in no acute distress.  A&O by 4.  Skin: Right thumb-4 interrupted sutures in place over dorsal aspect of left thumb.  No wound dehiscence or discharge.  No surrounding erythema or warmth.  Good wound approximation.              Assessment/Plan:        1. Visit for suture removal    2. Laceration of right thumb without foreign body without damage to nail, subsequent encounter    -Patient discharged MMI and may return to work full duty.  -Recommend she keep healing wound clean, dry, and covered while at work.  -Continue to monitor for signs of developing infection including redness, streaking, fevers, or purulent discharge.  -Patient verbalized understanding of treatment plan and has no further questions regarding care.      "

## 2021-08-19 ENCOUNTER — HOSPITAL ENCOUNTER (EMERGENCY)
Facility: MEDICAL CENTER | Age: 46
End: 2021-08-19
Attending: EMERGENCY MEDICINE
Payer: MEDICAID

## 2021-08-19 ENCOUNTER — APPOINTMENT (OUTPATIENT)
Dept: RADIOLOGY | Facility: MEDICAL CENTER | Age: 46
End: 2021-08-19
Attending: EMERGENCY MEDICINE

## 2021-08-19 ENCOUNTER — PATIENT OUTREACH (OUTPATIENT)
Dept: HEALTH INFORMATION MANAGEMENT | Facility: OTHER | Age: 46
End: 2021-08-19

## 2021-08-19 VITALS
BODY MASS INDEX: 23.84 KG/M2 | DIASTOLIC BLOOD PRESSURE: 77 MMHG | TEMPERATURE: 97.4 F | HEART RATE: 69 BPM | SYSTOLIC BLOOD PRESSURE: 125 MMHG | HEIGHT: 69 IN | OXYGEN SATURATION: 97 % | WEIGHT: 160.94 LBS | RESPIRATION RATE: 16 BRPM

## 2021-08-19 DIAGNOSIS — N39.0 ACUTE UTI: ICD-10-CM

## 2021-08-19 DIAGNOSIS — J40 BRONCHITIS: ICD-10-CM

## 2021-08-19 DIAGNOSIS — R07.81 PLEURITIC CHEST PAIN: ICD-10-CM

## 2021-08-19 LAB
ALBUMIN SERPL BCP-MCNC: 4.4 G/DL (ref 3.2–4.9)
ALBUMIN/GLOB SERPL: 1.4 G/DL
ALP SERPL-CCNC: 70 U/L (ref 30–99)
ALT SERPL-CCNC: 14 U/L (ref 2–50)
ANION GAP SERPL CALC-SCNC: 13 MMOL/L (ref 7–16)
APPEARANCE UR: CLEAR
AST SERPL-CCNC: 19 U/L (ref 12–45)
BACTERIA #/AREA URNS HPF: ABNORMAL /HPF
BASOPHILS # BLD AUTO: 0.8 % (ref 0–1.8)
BASOPHILS # BLD: 0.09 K/UL (ref 0–0.12)
BILIRUB SERPL-MCNC: 0.5 MG/DL (ref 0.1–1.5)
BILIRUB UR QL STRIP.AUTO: NEGATIVE
BUN SERPL-MCNC: 10 MG/DL (ref 8–22)
CALCIUM SERPL-MCNC: 9.4 MG/DL (ref 8.5–10.5)
CHLORIDE SERPL-SCNC: 100 MMOL/L (ref 96–112)
CO2 SERPL-SCNC: 23 MMOL/L (ref 20–33)
COLOR UR: YELLOW
CREAT SERPL-MCNC: 0.69 MG/DL (ref 0.5–1.4)
D DIMER PPP IA.FEU-MCNC: 0.33 UG/ML (FEU) (ref 0–0.5)
EKG IMPRESSION: NORMAL
EOSINOPHIL # BLD AUTO: 0.2 K/UL (ref 0–0.51)
EOSINOPHIL NFR BLD: 1.8 % (ref 0–6.9)
EPI CELLS #/AREA URNS HPF: NEGATIVE /HPF
ERYTHROCYTE [DISTWIDTH] IN BLOOD BY AUTOMATED COUNT: 41.7 FL (ref 35.9–50)
FLUAV RNA SPEC QL NAA+PROBE: NEGATIVE
FLUBV RNA SPEC QL NAA+PROBE: NEGATIVE
GLOBULIN SER CALC-MCNC: 3.1 G/DL (ref 1.9–3.5)
GLUCOSE SERPL-MCNC: 103 MG/DL (ref 65–99)
GLUCOSE UR STRIP.AUTO-MCNC: NEGATIVE MG/DL
HCG SERPL QL: NEGATIVE
HCT VFR BLD AUTO: 44.1 % (ref 37–47)
HGB BLD-MCNC: 15.4 G/DL (ref 12–16)
HYALINE CASTS #/AREA URNS LPF: ABNORMAL /LPF
IMM GRANULOCYTES # BLD AUTO: 0.05 K/UL (ref 0–0.11)
IMM GRANULOCYTES NFR BLD AUTO: 0.5 % (ref 0–0.9)
KETONES UR STRIP.AUTO-MCNC: NEGATIVE MG/DL
LEUKOCYTE ESTERASE UR QL STRIP.AUTO: NEGATIVE
LIPASE SERPL-CCNC: 37 U/L (ref 11–82)
LYMPHOCYTES # BLD AUTO: 1.96 K/UL (ref 1–4.8)
LYMPHOCYTES NFR BLD: 17.9 % (ref 22–41)
MCH RBC QN AUTO: 33.8 PG (ref 27–33)
MCHC RBC AUTO-ENTMCNC: 34.9 G/DL (ref 33.6–35)
MCV RBC AUTO: 96.9 FL (ref 81.4–97.8)
MICRO URNS: ABNORMAL
MONOCYTES # BLD AUTO: 0.86 K/UL (ref 0–0.85)
MONOCYTES NFR BLD AUTO: 7.9 % (ref 0–13.4)
NEUTROPHILS # BLD AUTO: 7.78 K/UL (ref 2–7.15)
NEUTROPHILS NFR BLD: 71.1 % (ref 44–72)
NITRITE UR QL STRIP.AUTO: POSITIVE
NRBC # BLD AUTO: 0 K/UL
NRBC BLD-RTO: 0 /100 WBC
PH UR STRIP.AUTO: 8 [PH] (ref 5–8)
PLATELET # BLD AUTO: 280 K/UL (ref 164–446)
PMV BLD AUTO: 9.4 FL (ref 9–12.9)
POTASSIUM SERPL-SCNC: 4.2 MMOL/L (ref 3.6–5.5)
PROT SERPL-MCNC: 7.5 G/DL (ref 6–8.2)
PROT UR QL STRIP: NEGATIVE MG/DL
RBC # BLD AUTO: 4.55 M/UL (ref 4.2–5.4)
RBC # URNS HPF: ABNORMAL /HPF
RBC UR QL AUTO: NEGATIVE
RSV RNA SPEC QL NAA+PROBE: NEGATIVE
SARS-COV-2 RNA RESP QL NAA+PROBE: NOTDETECTED
SODIUM SERPL-SCNC: 136 MMOL/L (ref 135–145)
SP GR UR STRIP.AUTO: 1.01
SPECIMEN SOURCE: NORMAL
TROPONIN T SERPL-MCNC: <6 NG/L (ref 6–19)
UROBILINOGEN UR STRIP.AUTO-MCNC: 0.2 MG/DL
WBC # BLD AUTO: 10.9 K/UL (ref 4.8–10.8)
WBC #/AREA URNS HPF: ABNORMAL /HPF

## 2021-08-19 PROCEDURE — 36415 COLL VENOUS BLD VENIPUNCTURE: CPT

## 2021-08-19 PROCEDURE — 0241U HCHG SARS-COV-2 COVID-19 NFCT DS RESP RNA 4 TRGT MIC: CPT

## 2021-08-19 PROCEDURE — 84703 CHORIONIC GONADOTROPIN ASSAY: CPT

## 2021-08-19 PROCEDURE — 700111 HCHG RX REV CODE 636 W/ 250 OVERRIDE (IP): Performed by: EMERGENCY MEDICINE

## 2021-08-19 PROCEDURE — 81001 URINALYSIS AUTO W/SCOPE: CPT

## 2021-08-19 PROCEDURE — C9803 HOPD COVID-19 SPEC COLLECT: HCPCS | Performed by: EMERGENCY MEDICINE

## 2021-08-19 PROCEDURE — 85025 COMPLETE CBC W/AUTO DIFF WBC: CPT

## 2021-08-19 PROCEDURE — 71045 X-RAY EXAM CHEST 1 VIEW: CPT

## 2021-08-19 PROCEDURE — 99284 EMERGENCY DEPT VISIT MOD MDM: CPT

## 2021-08-19 PROCEDURE — 84484 ASSAY OF TROPONIN QUANT: CPT

## 2021-08-19 PROCEDURE — 93005 ELECTROCARDIOGRAM TRACING: CPT | Performed by: EMERGENCY MEDICINE

## 2021-08-19 PROCEDURE — 83690 ASSAY OF LIPASE: CPT

## 2021-08-19 PROCEDURE — 93005 ELECTROCARDIOGRAM TRACING: CPT

## 2021-08-19 PROCEDURE — 80053 COMPREHEN METABOLIC PANEL: CPT

## 2021-08-19 PROCEDURE — 85379 FIBRIN DEGRADATION QUANT: CPT

## 2021-08-19 PROCEDURE — 96374 THER/PROPH/DIAG INJ IV PUSH: CPT

## 2021-08-19 RX ORDER — ALBUTEROL SULFATE 90 UG/1
2 AEROSOL, METERED RESPIRATORY (INHALATION) EVERY 6 HOURS PRN
Qty: 8.5 G | Refills: 0 | Status: SHIPPED | OUTPATIENT
Start: 2021-08-19

## 2021-08-19 RX ORDER — KETOROLAC TROMETHAMINE 30 MG/ML
15 INJECTION, SOLUTION INTRAMUSCULAR; INTRAVENOUS ONCE
Status: COMPLETED | OUTPATIENT
Start: 2021-08-19 | End: 2021-08-19

## 2021-08-19 RX ORDER — NITROFURANTOIN 25; 75 MG/1; MG/1
100 CAPSULE ORAL 2 TIMES DAILY
Qty: 10 CAPSULE | Refills: 0 | Status: SHIPPED | OUTPATIENT
Start: 2021-08-19 | End: 2021-08-24

## 2021-08-19 RX ORDER — ACETAMINOPHEN 325 MG/1
650 TABLET ORAL EVERY 4 HOURS PRN
COMMUNITY

## 2021-08-19 RX ADMIN — KETOROLAC TROMETHAMINE 15 MG: 30 INJECTION, SOLUTION INTRAMUSCULAR; INTRAVENOUS at 17:46

## 2021-08-19 SDOH — ECONOMIC STABILITY: TRANSPORTATION INSECURITY
IN THE PAST 12 MONTHS, HAS THE LACK OF TRANSPORTATION KEPT YOU FROM MEDICAL APPOINTMENTS OR FROM GETTING MEDICATIONS?: NO

## 2021-08-19 SDOH — ECONOMIC STABILITY: INCOME INSECURITY: IN THE LAST 12 MONTHS, WAS THERE A TIME WHEN YOU WERE NOT ABLE TO PAY THE MORTGAGE OR RENT ON TIME?: NO

## 2021-08-19 SDOH — ECONOMIC STABILITY: HOUSING INSECURITY
IN THE LAST 12 MONTHS, WAS THERE A TIME WHEN YOU DID NOT HAVE A STEADY PLACE TO SLEEP OR SLEPT IN A SHELTER (INCLUDING NOW)?: NO

## 2021-08-19 SDOH — ECONOMIC STABILITY: FOOD INSECURITY: WITHIN THE PAST 12 MONTHS, THE FOOD YOU BOUGHT JUST DIDN'T LAST AND YOU DIDN'T HAVE MONEY TO GET MORE.: NEVER TRUE

## 2021-08-19 SDOH — ECONOMIC STABILITY: FOOD INSECURITY: WITHIN THE PAST 12 MONTHS, YOU WORRIED THAT YOUR FOOD WOULD RUN OUT BEFORE YOU GOT MONEY TO BUY MORE.: NEVER TRUE

## 2021-08-19 SDOH — ECONOMIC STABILITY: TRANSPORTATION INSECURITY
IN THE PAST 12 MONTHS, HAS LACK OF TRANSPORTATION KEPT YOU FROM MEETINGS, WORK, OR FROM GETTING THINGS NEEDED FOR DAILY LIVING?: NO

## 2021-08-19 ASSESSMENT — LIFESTYLE VARIABLES: DO YOU DRINK ALCOHOL: NO

## 2021-08-19 ASSESSMENT — SOCIAL DETERMINANTS OF HEALTH (SDOH): HOW HARD IS IT FOR YOU TO PAY FOR THE VERY BASICS LIKE FOOD, HOUSING, MEDICAL CARE, AND HEATING?: NOT VERY HARD

## 2021-08-19 NOTE — ED PROVIDER NOTES
"ED Provider Note    CHIEF COMPLAINT  Chief Complaint   Patient presents with   • Rib Pain     Pt with c/o left side rib pain the radiates down left side back since Mon. pt denies lifting anything heavy, denies fever/chills. pt states sitting up from laying down is painful.    • Back Pain       HPI  Harmony Villatoro is a 45 y.o. female who presents to the emergency department complaining of left-sided chest discomfort with radiation to back. Past medical history as documented below. She explains that for the last four days she is been experiencing sharp chest discomfort below her left breast to her left flank and mid back. Worse with inspiration or movement. No notable known trauma injury or strain. No rash. No shortness of breath. No prior known cardiopulmonary pathology. No leg pain or swelling. No recent travel. She has had her COVID vaccine and does not believe she has had COVID previously.    REVIEW OF SYSTEMS  See HPI for further details. All other systems are negative.     PAST MEDICAL HISTORY   has a past medical history of Hypohidrotic X-linked ectodermal dysplasia.    SOCIAL HISTORY  Social History     Tobacco Use   • Smoking status: Current Some Day Smoker   • Smokeless tobacco: Never Used   Substance and Sexual Activity   • Alcohol use: Yes   • Drug use: No   • Sexual activity: Not on file       SURGICAL HISTORY   has a past surgical history that includes gyn surgery.    CURRENT MEDICATIONS  Home Medications     Reviewed by Cecilio Pennington (Pharmacy Tech) on 08/19/21 at 1629  Med List Status: Complete   Medication Last Dose Status   acetaminophen (TYLENOL) 325 MG Tab prn Active   DULoxetine (CYMBALTA) 30 MG Cap DR Particles 8/19/2021 Active   ibuprofen (MOTRIN) 200 MG Tab prn Active                ALLERGIES  Allergies   Allergen Reactions   • Sulfa Drugs Unspecified     Pt states \"I get bad abdominal pain\".         PHYSICAL EXAM  VITAL SIGNS: /77   Pulse 69   Temp 36.3 °C (97.4 °F) " "(Oral)   Resp 16   Ht 1.753 m (5' 9\")   Wt 73 kg (160 lb 15 oz)   SpO2 97%   BMI 23.77 kg/m²  @ERMELINDA[[id number]::@   Pulse ox interpretation: I interpret this pulse ox as normal.  Constitutional: Alert in no apparent distress.  HENT: No signs of trauma, Bilateral external ears normal, Nose normal.   Eyes: Pupils are equal and reactive  Neck: Normal range of motion, No tenderness, Supple  Cardiovascular: Regular rate and rhythm, no murmurs.   Thorax & Lungs: Normal breath sounds, No respiratory distress, No wheezing, No chest tenderness.   Abdomen: Bowel sounds normal, Soft, No tenderness  Skin: Warm, Dry, No erythema, No rash.   Back: No bony tenderness, No CVA tenderness.   Extremities: Intact distal pulses, No edema, No tenderness.   Musculoskeletal: Good range of motion in all major joints. No tenderness to palpation or major deformities noted.   Neurologic: Alert , Normal motor function, Normal sensory function, No focal deficits noted.   Psychiatric: Affect normal, Judgment normal, Mood normal.       DIAGNOSTIC STUDIES / PROCEDURES    LABS  Results for orders placed or performed during the hospital encounter of 08/19/21   CBC w/ Differential   Result Value Ref Range    WBC 10.9 (H) 4.8 - 10.8 K/uL    RBC 4.55 4.20 - 5.40 M/uL    Hemoglobin 15.4 12.0 - 16.0 g/dL    Hematocrit 44.1 37.0 - 47.0 %    MCV 96.9 81.4 - 97.8 fL    MCH 33.8 (H) 27.0 - 33.0 pg    MCHC 34.9 33.6 - 35.0 g/dL    RDW 41.7 35.9 - 50.0 fL    Platelet Count 280 164 - 446 K/uL    MPV 9.4 9.0 - 12.9 fL    Neutrophils-Polys 71.10 44.00 - 72.00 %    Lymphocytes 17.90 (L) 22.00 - 41.00 %    Monocytes 7.90 0.00 - 13.40 %    Eosinophils 1.80 0.00 - 6.90 %    Basophils 0.80 0.00 - 1.80 %    Immature Granulocytes 0.50 0.00 - 0.90 %    Nucleated RBC 0.00 /100 WBC    Neutrophils (Absolute) 7.78 (H) 2.00 - 7.15 K/uL    Lymphs (Absolute) 1.96 1.00 - 4.80 K/uL    Monos (Absolute) 0.86 (H) 0.00 - 0.85 K/uL    Eos (Absolute) 0.20 0.00 - 0.51 K/uL    Estefania " (Absolute) 0.09 0.00 - 0.12 K/uL    Immature Granulocytes (abs) 0.05 0.00 - 0.11 K/uL    NRBC (Absolute) 0.00 K/uL   Complete Metabolic Panel (CMP)   Result Value Ref Range    Sodium 136 135 - 145 mmol/L    Potassium 4.2 3.6 - 5.5 mmol/L    Chloride 100 96 - 112 mmol/L    Co2 23 20 - 33 mmol/L    Anion Gap 13.0 7.0 - 16.0    Glucose 103 (H) 65 - 99 mg/dL    Bun 10 8 - 22 mg/dL    Creatinine 0.69 0.50 - 1.40 mg/dL    Calcium 9.4 8.5 - 10.5 mg/dL    AST(SGOT) 19 12 - 45 U/L    ALT(SGPT) 14 2 - 50 U/L    Alkaline Phosphatase 70 30 - 99 U/L    Total Bilirubin 0.5 0.1 - 1.5 mg/dL    Albumin 4.4 3.2 - 4.9 g/dL    Total Protein 7.5 6.0 - 8.2 g/dL    Globulin 3.1 1.9 - 3.5 g/dL    A-G Ratio 1.4 g/dL   Troponin STAT   Result Value Ref Range    Troponin T <6 6 - 19 ng/L   Lipase   Result Value Ref Range    Lipase 37 11 - 82 U/L   URINALYSIS    Specimen: Urine, Clean Catch   Result Value Ref Range    Color Yellow     Character Clear     Specific Gravity 1.011 <1.035    Ph 8.0 5.0 - 8.0    Glucose Negative Negative mg/dL    Ketones Negative Negative mg/dL    Protein Negative Negative mg/dL    Bilirubin Negative Negative    Urobilinogen, Urine 0.2 Negative    Nitrite Positive (A) Negative    Leukocyte Esterase Negative Negative    Occult Blood Negative Negative    Micro Urine Req Microscopic    D-Dimer (only helpful in low pre-test probability wells critieria. Do not order if patient ruled out by PERC criteria. See Weblinks at top of Labs section)   Result Value Ref Range    D-Dimer Screen 0.33 0.00 - 0.50 ug/mL (FEU)   COV-2, FLU A/B, AND RSV BY PCR (2-4 HOURS CEPHEID): Collect NP swab in VTM    Specimen: Nasopharyngeal; Respirate   Result Value Ref Range    Influenza virus A RNA Negative Negative    Influenza virus B, PCR Negative Negative    RSV, PCR Negative Negative    SARS-CoV-2 by PCR NotDetected     SARS-CoV-2 Source NP Swab    HCG Qual Serum   Result Value Ref Range    Beta-Hcg Qualitative Serum Negative Negative    ESTIMATED GFR   Result Value Ref Range    GFR If African American >60 >60 mL/min/1.73 m 2    GFR If Non African American >60 >60 mL/min/1.73 m 2   URINE MICROSCOPIC (W/UA)   Result Value Ref Range    WBC 0-2 /hpf    RBC 0-2 /hpf    Bacteria Many (A) None /hpf    Epithelial Cells Negative /hpf    Hyaline Cast 0-2 /lpf   EKG   Result Value Ref Range    Report       [facility] Emergency Dept.    Test Date:  2021  Pt Name:    FREIDA CHOU               Department: ER  MRN:        [id number]                      Room:  Gender:     Female                       Technician: [id number]  :        [id number]                   Requested By:ER TRIAGE PROTOCOL  Order #:    [id number]                    Reading MD: Jamari Veliz    Measurements  Intervals                                Axis  Rate:       64                           P:          0  ND:         151                          QRS:        41  QRSD:       84                           T:          59  QT:         400  QTc:        413    Interpretive Statements  SINUS RHYTHM  BASELINE WANDER IN LEAD(S) V6  No previous ECG available for comparison  Electronically Signed On 2021 15:44:44 PDT by Jamari Veliz           RADIOLOGY  DX-CHEST-PORTABLE (1 VIEW)   Final Result         1.  Mild scattered linear and poorly defined opacifications are noted which could indicate bronchitis or pneumonitis. Atelectasis or scarring is also possible.      2.  No consolidations identified.              COURSE & MEDICAL DECISION MAKING  Pertinent Labs & Imaging studies reviewed. (See chart for details)  45-year-old female presented emergency room with the above presentation. Given her pain pathology including cardiopulmonary pathology ACS and pulmonary embolus primarily were considered. She is a smoker so additional pulmonary pathology from asthma/COPD or other viral pathology also considered. Does have a history of urinary tract infection so this also was pursued  given the potential for CVA tenderness which was mild. Workup is largely benign. Low risk from an ACS standpoint. Troponin EKG negative. Chest x-ray as documented above questioning what I clinically suspect to be more bronchitis the pneumonitis. Furthermore she does have evidence of urinary tract infection. I will treat her for this. She understanding of outpatient follow-up as referred understanding of return precautions here the ER if needed.      The patient will return for worsening symptoms and is stable at the time of discharge. The patient verbalizes understanding and will comply.    FINAL IMPRESSION  1. Pleuritic chest pain    2. Acute UTI    3. Bronchitis            Electronically signed by: Jamari Veliz M.D., 8/19/2021 3:43 PM

## 2021-08-19 NOTE — ED NOTES
"Med Rec completed: per pt at bedside      No ORAL antibiotics in last 30 days    Preferred Pharmacy: [facility]    Pt confirmed following allergies:  Allergies   Allergen Reactions   • Sulfa Drugs Unspecified     Pt states \"I get bad abdominal pain\".          Pt's home medications:   No current facility-administered medications on file prior to encounter.     Medication Sig   • acetaminophen (TYLENOL) 325 MG Tab Take 650 mg by mouth every four hours as needed for Mild Pain.   • DULoxetine (CYMBALTA) 30 MG Cap DR Particles Take 30 mg by mouth every day.   • ibuprofen (MOTRIN) 200 MG Tab Take 400 mg by mouth every 6 hours as needed for Mild Pain.       Removed medications:   Medication Removal Reason   • [DISCONTINUED] naproxen (NAPROSYN) 500 MG Tab Pt reports not taking       "

## 2021-08-19 NOTE — ED TRIAGE NOTES
Chief Complaint   Patient presents with   • Rib Pain     Pt with c/o left side rib pain the radiates down left side back since Mon. pt denies lifting anything heavy, denies fever/chills. pt states sitting up from laying down is painful.    • Back Pain     Explained to pt triage process, made pt aware to tell this RN/staff of any changes/concerns, pt verbalized understanding of process and instructions given. Pt to ER lobby.

## 2021-08-19 NOTE — DISCHARGE PLANNING
ED Community Health Worker Intake  • Social determinates of health intake complete.   • Identified no barriers.  • Patient will be scheduled at [facility] for primary care.  • Has transportation to appointment: Yes with [facility] [employer]  • Inpatient assessment completed.  • Contact information provided to Harmony Villatoro.    ANJANAW Cynthia and TERESA Doyle met with pt at bedside. Pt would like to establish with a PCP at [facility]. Pt will utilize [employer] through [facility] for transportation. Pt is in no need of food or housing assistance at this time. Pt reported that she no longer has Medicaid and is now uninsured.      Plan: CHW will pre-register pt at [facility]. [facility] will call pt to schedule within 48 hours. CHW will follow up with pt next week.

## 2021-08-19 NOTE — PROGRESS NOTES
ED Community Health Worker Intake  • Social determinates of health intake complete.   • Identified no barriers.  • Patient will be scheduled at [facility] for primary care.  • Has transportation to appointment: Yes with [facility] [employer]  • Inpatient assessment completed.  • Contact information provided to Harmony Villatoro.    ANJANAW Cynthia and TERESA Doyle met with pt at bedside. Pt would like to establish with a PCP at [facility]. Pt will utilize [employer] through [facility] for transportation. Pt is in no need of food or housing assistance at this time. Pt reported that she no longer has Medicaid and is now uninsured.      Plan: CHW will pre-register pt at [facility]. [facility] will call pt to schedule within 48 hours. CHW will follow up with pt next week.

## 2021-08-20 NOTE — ED NOTES
Discharge instructions given.  All questions answered.  Prescriptions given x2.  Pt to follow-up with pcp, return to ER if symptoms worsen as discussed.  Pt verbalized understanding.  All belongings with pt.  Pt ambulated to lobby.

## 2021-08-24 ENCOUNTER — PATIENT OUTREACH (OUTPATIENT)
Dept: HEALTH INFORMATION MANAGEMENT | Facility: OTHER | Age: 46
End: 2021-08-24

## 2021-08-24 NOTE — PROGRESS NOTES
08/24/21  CHW called patient to follow up. CHW was unable to leave a voice message as patient's mail box was full.    08/26/21  CHW called patient to follow up. CHW was unable to leave a voice message as patient's mail box was full.    08/27/21  CHW called patient to follow up. CHW was unable to leave a voice message as patient's mail box was full. CHW will not follow as she cannot contact patient.

## 2022-02-26 ENCOUNTER — HOSPITAL ENCOUNTER (EMERGENCY)
Facility: MEDICAL CENTER | Age: 47
End: 2022-02-27
Attending: EMERGENCY MEDICINE
Payer: COMMERCIAL

## 2022-02-26 ENCOUNTER — APPOINTMENT (OUTPATIENT)
Dept: RADIOLOGY | Facility: MEDICAL CENTER | Age: 47
End: 2022-02-26
Attending: EMERGENCY MEDICINE
Payer: COMMERCIAL

## 2022-02-26 DIAGNOSIS — S62.639B: ICD-10-CM

## 2022-02-26 DIAGNOSIS — M79.645 FINGER PAIN, LEFT: ICD-10-CM

## 2022-02-26 PROCEDURE — 304999 HCHG REPAIR-SIMPLE/INTERMED LEVEL 1

## 2022-02-26 PROCEDURE — 99284 EMERGENCY DEPT VISIT MOD MDM: CPT

## 2022-02-26 PROCEDURE — 73130 X-RAY EXAM OF HAND: CPT | Mod: LT

## 2022-02-26 PROCEDURE — 303747 HCHG EXTRA SUTURE

## 2022-02-26 PROCEDURE — 700101 HCHG RX REV CODE 250: Performed by: EMERGENCY MEDICINE

## 2022-02-26 PROCEDURE — 304217 HCHG IRRIGATION SYSTEM

## 2022-02-26 RX ADMIN — LIDOCAINE HYDROCHLORIDE 1 ML: 10 INJECTION, SOLUTION INFILTRATION; PERINEURAL at 23:45

## 2022-02-26 ASSESSMENT — FIBROSIS 4 INDEX: FIB4 SCORE: 0.8

## 2022-02-27 VITALS
OXYGEN SATURATION: 99 % | TEMPERATURE: 97.3 F | DIASTOLIC BLOOD PRESSURE: 82 MMHG | WEIGHT: 153.44 LBS | RESPIRATION RATE: 16 BRPM | SYSTOLIC BLOOD PRESSURE: 129 MMHG | BODY MASS INDEX: 22.73 KG/M2 | HEIGHT: 69 IN | HEART RATE: 70 BPM

## 2022-02-27 PROCEDURE — 700102 HCHG RX REV CODE 250 W/ 637 OVERRIDE(OP): Performed by: EMERGENCY MEDICINE

## 2022-02-27 PROCEDURE — A9270 NON-COVERED ITEM OR SERVICE: HCPCS | Performed by: EMERGENCY MEDICINE

## 2022-02-27 RX ORDER — AMOXICILLIN AND CLAVULANATE POTASSIUM 875; 125 MG/1; MG/1
1 TABLET, FILM COATED ORAL 2 TIMES DAILY
Qty: 14 TABLET | Refills: 0 | Status: SHIPPED | OUTPATIENT
Start: 2022-02-27 | End: 2022-03-06

## 2022-02-27 RX ORDER — HYDROCODONE BITARTRATE AND ACETAMINOPHEN 5; 325 MG/1; MG/1
1 TABLET ORAL ONCE
Status: COMPLETED | OUTPATIENT
Start: 2022-02-27 | End: 2022-02-27

## 2022-02-27 RX ADMIN — HYDROCODONE BITARTRATE AND ACETAMINOPHEN 1 TABLET: 5; 325 TABLET ORAL at 00:30

## 2022-02-27 NOTE — ED NOTES
Md at bedside to assess. Bandage removed, bleeding controlled. + LRA pulse. O2 sat in neighboring fingers 99%

## 2022-02-27 NOTE — ED NOTES
Pt A/Ox3 unlaboured breathing VSS. Agrees and understands d/c teaching. Ambulated off unit. Finger laceration dressing intact

## 2022-02-27 NOTE — ED TRIAGE NOTES
Chief Complaint   Patient presents with   • Finger Injury     X 30 min     Pt ambulatory to triage for above complaint. Pt states she was at work, her left 4th finger got slammed in a heavy door and she pulled it out. Portion of finger tip and fingernail missing, bleeding controlled in triage, +CMS. Pt believes she is up to date on TDAP.     Pt is alert/oriented, following commands, and answering questions appropriately. No acute distress noted in triage and respirations are even and unlabored.   Pt placed in lobby and educated on triage process. Pt encouraged to alert staff for any changes in condition.

## 2022-02-27 NOTE — ED PROVIDER NOTES
"ED Provider Note      CHIEF COMPLAINT  Chief Complaint   Patient presents with   • Finger Injury     X 30 min     HPI  Harmony Villatoro is a 46 y.o. female who presents to room for pain and tenderness along with a wound to the left fourth digit.  She says she was at work on her break when she slammed the finger in a heavy exit door.  There is immediate pain and discomfort and she noticed a large chunk missing from the tip of her finger.  This was without a remaining flap, she put a dressing and compression on it and came to the emergency department.  She is unsure of her last tetanus status, she does not have a history of coagulopathy.  No other acute constitutional complaints.    REVIEW OF SYSTEMS  No numbness or weakness remaining digits.    PAST MEDICAL HISTORY  Past Medical History:   Diagnosis Date   • Hypohidrotic X-linked ectodermal dysplasia    • PCOS (polycystic ovarian syndrome)    • Psychiatric disorder     Major depresive disorder, anxiety, PTSD       SOCIAL HISTORY  Social History     Tobacco Use   • Smoking status: Current Some Day Smoker     Packs/day: 0.50   • Smokeless tobacco: Never Used   Vaping Use   • Vaping Use: Never used   Substance Use Topics   • Alcohol use: Yes     Comment: \"A beer every other day\"   • Drug use: No     CURRENT MEDICATIONS  Home Medications     Reviewed by Riya Chou R.N. (Registered Nurse) on 02/26/22 at 2240  Med List Status: <None>   Medication Last Dose Status   acetaminophen (TYLENOL) 325 MG Tab  Active   albuterol 108 (90 Base) MCG/ACT Aero Soln inhalation aerosol  Active   DULoxetine (CYMBALTA) 30 MG Cap DR Particles  Active   ibuprofen (MOTRIN) 200 MG Tab  Active              ALLERGIES  Allergies   Allergen Reactions   • Sulfa Drugs Unspecified     Pt states \"I get bad abdominal pain\".       PHYSICAL EXAM  VITAL SIGNS: /79   Pulse 75   Temp 36.1 °C (97 °F) (Oral)   Resp 18   Ht 1.753 m (5' 9\")   Wt 69.6 kg (153 lb 7 oz)   SpO2 98%   BMI 22.66 " kg/m²    Constitutional: No distress  HENT:  Atraumatic   Eyes: Normal inspection  Cardiovascular: Normal heart rate  Thorax & Lungs: No respiratory distress  Skin: Distal fingertip avulsion injury of approximately 1cm x 0.5cm involving the distal nail on the fourth phalanx.  There is very small amounts of bleeding, cutaneous numbness is present, no visualized bone tissue on initial assessment.  Extremities: As mentioned above, no impairment in flexion extension, neurovascularly intact up to the level of the injury.  No penetrating injury to the palmar digit.  No other long bone injuries to the affected extremity.  Neurologic: as above  Psychiatric: Affect normal    COURSE & MEDICAL DECISION MAKING  Patient is neurovascularly intact.  Avulsion injury is without a remaining flap for reapproximation.  No visualized bone initially, upon cleaning there was a very small visualized portion within the meat of the exposed area.  X-ray obtained shows tuft fracture/distal fingertip amputation.  No other acute foreign bodies or foreign debris.  Following irrigation and anesthesia per my note below, approximated closure and envelope formation of the remaining soft tissue over the exposed fracture fragment was obtained.  This had good hemostasis, following the closure this was overwrapped with antibiosic dressing/iodoform dressing was applied.  Fingertip splint was placed and patient will follow-up with hand clinic, Dr. Merino is on-call.  Patient will be placed on Augmentin due to the open nature of his wound.  No foreign bodies. No motor dysfunction.  Patient is counseled regarding the need for reevaluation in the emergency room if there are signs of secondary infection spreading despite being on antibiosis.    Laceration Repair Procedure Note  Indication: Laceration  Procedure: The patient was placed in the appropriate position and anesthesia around the laceration was obtained by infiltration via a digit block using 1%  Lidocaine without epinephrine. The area was then irrigated/soaked with normal saline. The laceration was closed with 4x 5-0 vicryl sutures to form covering over any remaining exposed bone. There were no additional lacerations requiring repair. The wound area was then dressed with bacitracin and a sterile dressing.      Tetanus updated here  Total repaired wound length: 1 cm.     The patient tolerated the procedure well.    Patient instructed to have wound rechecked and keep clean at all times. Return to the emergency department for any signs of infection, including: Fevers, expanding redness, purulent drainage.    FINAL IMPRESSION  1. Laceration  2. 4ith digit finger pain  3. Distal digit finger amputation    Blood pressure reviewed and likely elevated secondary to medical condition. Advised home monitoring and followup.      This dictation was created using voice recognition software. The accuracy of the dictation is limited to the abilities of the software. I expect there may be some errors of grammar and possibly content. The nursing notes were reviewed and certain aspects of this information were incorporated into this note.    Electronically signed by: Kulwinder Skelton M.D., 2/26/2022 11:25 PM

## 2022-02-27 NOTE — DISCHARGE INSTRUCTIONS
You have been seen and evaluated for an open finger tip amputation with a tuft fracture of the bone underneath.  There is not enough material to cut the bone back on the remaining soft tissue has been sutured in a position to create a small envelope for this.  A dressing is then applied and I would recommend that he follow-up with our on-call hand surgeon upon discharge.  Because of the open nature of this he will be placed on Augmentin antibiotic for 7 days.  Please keep your dressing clean and dry, do not shower or expose this to any moisture over the next 2 days and then a dressing change can be obtained.  Please follow-up if you have any redness or discharge from the wound site or extension into the finger itself or into the palm of the hand.  If you are not acting like yourself or develop fevers and chills despite taking antibiotics please return to the emergency department.